# Patient Record
Sex: FEMALE | Race: WHITE | Employment: FULL TIME | URBAN - METROPOLITAN AREA
[De-identification: names, ages, dates, MRNs, and addresses within clinical notes are randomized per-mention and may not be internally consistent; named-entity substitution may affect disease eponyms.]

---

## 2020-04-11 ENCOUNTER — APPOINTMENT (OUTPATIENT)
Dept: GENERAL RADIOLOGY | Age: 50
DRG: 153 | End: 2020-04-11
Attending: EMERGENCY MEDICINE
Payer: COMMERCIAL

## 2020-04-11 ENCOUNTER — HOSPITAL ENCOUNTER (INPATIENT)
Facility: HOSPITAL | Age: 50
LOS: 3 days | Discharge: HOME | DRG: 153 | End: 2020-04-14
Attending: EMERGENCY MEDICINE | Admitting: HOSPITALIST
Payer: COMMERCIAL

## 2020-04-11 ENCOUNTER — APPOINTMENT (OUTPATIENT)
Dept: GENERAL RADIOLOGY | Facility: HOSPITAL | Age: 50
DRG: 153 | End: 2020-04-11
Attending: INTERNAL MEDICINE
Payer: COMMERCIAL

## 2020-04-11 DIAGNOSIS — J05.10 EPIGLOTTITIS: Primary | ICD-10-CM

## 2020-04-11 PROCEDURE — 71045 X-RAY EXAM CHEST 1 VIEW: CPT | Performed by: INTERNAL MEDICINE

## 2020-04-11 PROCEDURE — 70360 X-RAY EXAM OF NECK: CPT | Performed by: EMERGENCY MEDICINE

## 2020-04-11 PROCEDURE — 99223 1ST HOSP IP/OBS HIGH 75: CPT | Performed by: INTERNAL MEDICINE

## 2020-04-11 RX ORDER — SODIUM PHOSPHATE, DIBASIC AND SODIUM PHOSPHATE, MONOBASIC 7; 19 G/133ML; G/133ML
1 ENEMA RECTAL ONCE AS NEEDED
Status: DISCONTINUED | OUTPATIENT
Start: 2020-04-11 | End: 2020-04-14

## 2020-04-11 RX ORDER — HYDROMORPHONE HYDROCHLORIDE 1 MG/ML
0.5 INJECTION, SOLUTION INTRAMUSCULAR; INTRAVENOUS; SUBCUTANEOUS EVERY 30 MIN PRN
Status: DISCONTINUED | OUTPATIENT
Start: 2020-04-11 | End: 2020-04-14

## 2020-04-11 RX ORDER — DOCUSATE SODIUM 100 MG/1
100 CAPSULE, LIQUID FILLED ORAL 2 TIMES DAILY
Status: DISCONTINUED | OUTPATIENT
Start: 2020-04-11 | End: 2020-04-14

## 2020-04-11 RX ORDER — BISACODYL 10 MG
10 SUPPOSITORY, RECTAL RECTAL
Status: DISCONTINUED | OUTPATIENT
Start: 2020-04-11 | End: 2020-04-14

## 2020-04-11 RX ORDER — POLYETHYLENE GLYCOL 3350 17 G/17G
17 POWDER, FOR SOLUTION ORAL DAILY PRN
Status: DISCONTINUED | OUTPATIENT
Start: 2020-04-11 | End: 2020-04-14

## 2020-04-11 RX ORDER — ONDANSETRON 2 MG/ML
4 INJECTION INTRAMUSCULAR; INTRAVENOUS EVERY 4 HOURS PRN
Status: DISCONTINUED | OUTPATIENT
Start: 2020-04-11 | End: 2020-04-11

## 2020-04-11 RX ORDER — METOCLOPRAMIDE HYDROCHLORIDE 5 MG/ML
10 INJECTION INTRAMUSCULAR; INTRAVENOUS EVERY 8 HOURS PRN
Status: DISCONTINUED | OUTPATIENT
Start: 2020-04-11 | End: 2020-04-14

## 2020-04-11 RX ORDER — SODIUM CHLORIDE 9 MG/ML
INJECTION, SOLUTION INTRAVENOUS CONTINUOUS
Status: DISCONTINUED | OUTPATIENT
Start: 2020-04-11 | End: 2020-04-11

## 2020-04-11 RX ORDER — DEXAMETHASONE SODIUM PHOSPHATE 4 MG/ML
10 VIAL (ML) INJECTION ONCE
Status: COMPLETED | OUTPATIENT
Start: 2020-04-11 | End: 2020-04-11

## 2020-04-11 RX ORDER — HYDROMORPHONE HYDROCHLORIDE 1 MG/ML
0.5 INJECTION, SOLUTION INTRAMUSCULAR; INTRAVENOUS; SUBCUTANEOUS EVERY 30 MIN PRN
Status: ACTIVE | OUTPATIENT
Start: 2020-04-11 | End: 2020-04-11

## 2020-04-11 RX ORDER — ENOXAPARIN SODIUM 100 MG/ML
40 INJECTION SUBCUTANEOUS EVERY 24 HOURS
Status: DISCONTINUED | OUTPATIENT
Start: 2020-04-11 | End: 2020-04-14

## 2020-04-11 RX ORDER — ACETAMINOPHEN AND CODEINE PHOSPHATE 300; 30 MG/1; MG/1
2 TABLET ORAL EVERY 4 HOURS PRN
Status: DISCONTINUED | OUTPATIENT
Start: 2020-04-11 | End: 2020-04-14

## 2020-04-11 RX ORDER — ONDANSETRON 2 MG/ML
4 INJECTION INTRAMUSCULAR; INTRAVENOUS EVERY 6 HOURS PRN
Status: DISCONTINUED | OUTPATIENT
Start: 2020-04-11 | End: 2020-04-14

## 2020-04-11 RX ORDER — ONDANSETRON 2 MG/ML
4 INJECTION INTRAMUSCULAR; INTRAVENOUS ONCE
Status: COMPLETED | OUTPATIENT
Start: 2020-04-11 | End: 2020-04-11

## 2020-04-11 RX ORDER — FAMOTIDINE 10 MG/ML
20 INJECTION, SOLUTION INTRAVENOUS 2 TIMES DAILY
Status: DISCONTINUED | OUTPATIENT
Start: 2020-04-11 | End: 2020-04-14

## 2020-04-11 RX ORDER — ACETAMINOPHEN AND CODEINE PHOSPHATE 300; 30 MG/1; MG/1
1 TABLET ORAL EVERY 4 HOURS PRN
Status: DISCONTINUED | OUTPATIENT
Start: 2020-04-11 | End: 2020-04-14

## 2020-04-11 RX ORDER — ACETAMINOPHEN 325 MG/1
650 TABLET ORAL EVERY 4 HOURS PRN
Status: DISCONTINUED | OUTPATIENT
Start: 2020-04-11 | End: 2020-04-14

## 2020-04-11 NOTE — ED PROVIDER NOTES
Patient Seen in: THE [facility] Emergency Department In [place]      History   Patient presents with:  Sore Throat    Stated Complaint: sore throat, swollen glands    HPI    81-year-old female presents to the emergency department complaining of severe throat without extra sounds or murmurs. Regular rate and rhythm. Skin is dry without rashes or lesions. Neuro exam: Awake, conversive and moving all 4 extremities well.     ED Course     Labs Reviewed   CBC W/ DIFFERENTIAL - Abnormal; Notable for the following (exclusive of billable procedures) was administered to manage the patient's respiratory instability due to her epiglottitis.   This involved direct patient intervention, complex decision making, and/or extensive discussions with the patient, family, and cli

## 2020-04-11 NOTE — CONSULTS
[facility]  Report of Consultation    Wallace Vargas Patient Status:  Inpatient    1970 MRN [id number]   [facility] 6NE-A Attending Darling Ferris, DO   Hosp Day # 0 PCP No primary care provider on file.      Reason for Consultation: Systems:    Constitutional: States her weights been stable  Eyes: Denies any significant changes  Ears, nose, mouth, throat, and face: Sore throat difficulty swallowing as above  Respiratory: She denies any baseline coughing or dyspnea  Cardiovascular: She CO2 25.0 04/11/2020     04/11/2020    CA 9.8 04/11/2020    ALB 4.3 04/11/2020    ALKPHO 84 04/11/2020    BILT 2.1 04/11/2020    TP 7.8 04/11/2020    AST 26 04/11/2020    ALT 36 04/11/2020       Cultures:   Cultures are pending    Radiology:  Xr Soft

## 2020-04-11 NOTE — PLAN OF CARE
Arrived from [place] ED approx. 1445. Pt states that she only has minimal pain rating at 3/10. States that whatever they gave her in the ER is working. Holding a conversation, not dyspneic. Maintaining saturations around 93% RA.    Updated her sister of

## 2020-04-11 NOTE — CONSULTS
INFECTIOUS DISEASE CONSULTATION    Nataly Carter Patient Status:  Inpatient    1970 MRN [id number]   [facility] 6NE-A Attending Kevin Hernández, DO   Hosp Day # 0 PCP No primary care prov GM/118ML enema 133 mL, 1 enema, Rectal, Once PRN  •  acetaminophen (TYLENOL) tab 650 mg, 650 mg, Oral, Q4H PRN **OR** Acetaminophen-Codeine #3 (TYLENOL #3) 300-30 MG tab 1 tablet, 1 tablet, Oral, Q4H PRN **OR** Acetaminophen-Codeine #3 (TYLENOL #3) 300-30 ENT consult, watch air way    Testing ordered for SARS, RVP, and throat culture pending  dw nursing, will need to transfer to [facility] in [place] in case she needs emergent trach - pending SARS result    Joyce Low MD  [address] [place] [address]

## 2020-04-11 NOTE — RESPIRATORY THERAPY NOTE
SARS COVID and resp panel expanded swabs done. N95, gloves, gown, goggles PPE worn. Labeled and sent to lab.

## 2020-04-11 NOTE — H&P
[facility]  History and Physical     Palomomonie Ibarra Patient Status:  Inpatient    1970 MRN [id number]   [facility] 6NE-A Attending Sandra Joshua DO   Hosp Day # 0 PCP No primary care provider on file.      Chief Complaint: Sore atraumatic. Moist mucous membranes. EOM-I. Neck: No lymphadenopathy. No JVD  Respiratory: Clear to auscultation bilaterally. No wheezes. No rhonchi. Cardiovascular: S1, S2. Regular rate and rhythm. No murmurs, rubs or gallops. Equal pulses.    Chest and

## 2020-04-12 ENCOUNTER — APPOINTMENT (OUTPATIENT)
Dept: CT IMAGING | Facility: HOSPITAL | Age: 50
DRG: 153 | End: 2020-04-12
Attending: INTERNAL MEDICINE
Payer: COMMERCIAL

## 2020-04-12 PROCEDURE — 70491 CT SOFT TISSUE NECK W/DYE: CPT | Performed by: INTERNAL MEDICINE

## 2020-04-12 PROCEDURE — 99232 SBSQ HOSP IP/OBS MODERATE 35: CPT | Performed by: INTERNAL MEDICINE

## 2020-04-12 PROCEDURE — 0CJS8ZZ INSPECTION OF LARYNX, VIA NATURAL OR ARTIFICIAL OPENING ENDOSCOPIC: ICD-10-PCS | Performed by: OTOLARYNGOLOGY

## 2020-04-12 RX ORDER — DEXAMETHASONE SODIUM PHOSPHATE 4 MG/ML
10 VIAL (ML) INJECTION ONCE
Status: COMPLETED | OUTPATIENT
Start: 2020-04-12 | End: 2020-04-12

## 2020-04-12 RX ORDER — POTASSIUM CHLORIDE 14.9 MG/ML
20 INJECTION INTRAVENOUS ONCE
Status: COMPLETED | OUTPATIENT
Start: 2020-04-12 | End: 2020-04-12

## 2020-04-12 RX ORDER — DEXAMETHASONE SODIUM PHOSPHATE 4 MG/ML
8 VIAL (ML) INJECTION EVERY 8 HOURS
Status: DISCONTINUED | OUTPATIENT
Start: 2020-04-12 | End: 2020-04-13

## 2020-04-12 NOTE — SLP NOTE
Chart reviewed and orders received. Spoke with RN in regards to order- pt to remain NPO at this time given worsening symptoms. Pt currently on isolation for r/o COVID-19. Per COVID-19 protocol will hold until tests results are made available.  RN to inform

## 2020-04-12 NOTE — PROGRESS NOTES
Received this am alert and oriented  Complaints of feeling like throat is more swollen with pain extending to neck and behind ears as well as difficulty talking- Dr Susie Dawson notified  On 1 L Nasal canula  Lungs clear bilaterally  NSR on monitor  + SCD's in pl

## 2020-04-12 NOTE — PROGRESS NOTES
[facility]  Progress Note     Jan Gutierrez Patient Status:  Inpatient    1970 MRN [id number]   [facility] 4SW-A Attending Mikaela Hawley, DO   Hosp Day # 1 PCP No primary care provider on file.      Chief Complaint: dysphagia    S following  2. Rule/out COVID, low suspicion, hold off any hydroxychloroquine or azithromycin  3. Check Inflammatory markers and procalcitonin  4. Needs isolation until COVID back, transfer to medical ICU  3. Hyperglycemia - A1C 5.5%  4.  Oropharyngeal dysph

## 2020-04-12 NOTE — PLAN OF CARE
Assumed care of pt for the night shift. Pt alert and oriented x4. Placed on 1L NC overnight to sustain O2 sats greater than 92% pt saturations 90-91% on RA.  Per Dr. Alba Lal ENT, bedside RN dysphagia eval done, pt failed, coughing/ clearing of throat presen

## 2020-04-12 NOTE — PROGRESS NOTES
[facility]  Progress Note    Michaela Benz Patient Status:  Inpatient    1970 MRN [id number]   [facility] 4SW-A Attending [place],    Hosp Day # 1 PCP No primary care provider on file.      Subjective:  Michaela Benz is a(n) 4 CREATSERUM 0.76 0.65     No results for input(s): PTP, INR, PTT in the last 168 hours.     Cultures: Routine viral panel is negative  SARS-Cov is pending blood cultures pending  Group A strep negative  Radiology:  Xr Soft Tissue Neck (cpt=70360)    Addend

## 2020-04-12 NOTE — CONSULTS
Matilde Huang is a 51year old female. Patient presents with:  Sore Throat    HPI:    Patient admitted to the hospital from the [facility]. Her history is significant for a sore throat which started on Friday night.   It worsened t scope was not performed as this has been known to cause laryngospasm and obstruction in this patient population. In view of her significant improvement we will follow her clinically.     My recommendation is 24 to 48 hours of IV antibiotics and reassess (ADULT)  ADMIT TO INPATIENT  CONTACT AND [place] ISOLATION  REASON FOR NO DVT/VTE PHARMACOLOGIC PROPHYLAXIS  NPO  IP SLP EVAL AND TREAT  STRAIGHT CATH  VITAL SIGNS - NOTIFY PHYSICIAN

## 2020-04-13 ENCOUNTER — APPOINTMENT (OUTPATIENT)
Dept: GENERAL RADIOLOGY | Facility: HOSPITAL | Age: 50
DRG: 153 | End: 2020-04-13
Attending: INTERNAL MEDICINE
Payer: COMMERCIAL

## 2020-04-13 PROCEDURE — 71045 X-RAY EXAM CHEST 1 VIEW: CPT | Performed by: INTERNAL MEDICINE

## 2020-04-13 PROCEDURE — 99232 SBSQ HOSP IP/OBS MODERATE 35: CPT | Performed by: INTERNAL MEDICINE

## 2020-04-13 NOTE — SLP NOTE
Attempted evaluation, patient remains NPO and planning for ENT reassessment later today. Will continue to follow.     Warren Lozano Maik 88 CCC-SLP  Pager [phone]

## 2020-04-13 NOTE — PLAN OF CARE
Assumed patient care this AM. Up in chair. Patient states pain is improving. Remains NPO. Steroids D/C per Dr. Sierra Part ENT. Waiting speech eval. VSS. Transfer orders. Gave handoff report to RN.      Problem: PAIN - ADULT  Goal: Verbalizes/displays adequate

## 2020-04-13 NOTE — PLAN OF CARE
Pt transferred to [id number] at 1600H   AOX 4  RA, No SOB  NSR, SB  Pt reporting mild discomfort of throat   Seen by ST, approved for reg thin diet, menu given    Plan NPO post MN for possible procedure emily   Pt updated of plan of care

## 2020-04-13 NOTE — PLAN OF CARE
Received pt from ICU around 2100. Pt A/Ox4, following commands, and intact. Pt complaining of pain in throat rating it a 4/10 at beginning of night but denied pain medication.  Pt then reported she's \"doing much better\" and it \"doesn't hurt to swallow\"

## 2020-04-13 NOTE — PROGRESS NOTES
[facility]                INFECTIOUS DISEASE PROGRESS NOTE    Pedro Bauer Patient Status:  Inpatient    1970 MRN [id number]   [facility] 6NE-A Attending Nina Dixon MD   [facility] Day # 2 PCP Unknown Pcp     Antibiotics: Donita Garza 7.8 7.2 7.2       No results found for: [facility] Encounter on 04/11/20   1.  BLOOD CULTURE     Status: None (Preliminary result)    Collection Time: 04/11/20 12:59 PM   Result Value Ref Range    Blood Culture Result No Growth 1 Day N/

## 2020-04-13 NOTE — PROGRESS NOTES
[facility]  Progress Note     Jacquelin Perdomo Patient Status:  Inpatient    1970 MRN [id number]   [facility] 4SW-A Attending Mayda Liz MD   Hosp Day # 2 PCP Unknown Pcp     Chief Complaint: dysphagia    S: Patient up in chair, ASSESSMENT / PLAN:     1. Epiglottitis  1. Unasyn Day 3  2. ENT evaluated  3. Npo  4. Steroids IV  5. CT no evidence of abscess  6. Rapid strep neg, RVP neg, COVID neg, NGTD on blood cx  2. Dysphagia 2/2 above  1. Npo, fu on ENT recs   3.  Hardy Camera

## 2020-04-13 NOTE — SLP NOTE
ADULT SWALLOWING EVALUATION    ASSESSMENT    ASSESSMENT/OVERALL IMPRESSION:  Pt seen this PM for bedside swallow evaluation. RN approved session. Pt admitted to hospital due to c/o +fever, sore throat, and b/l ear pain with worsening dysphagia.  Pt unable t for Referral: R/O aspiration    Problem List  Principal Problem:    Epiglottitis  Active Problems:    Leukocytosis    Oropharyngeal dysphagia      Past Medical History  History reviewed. No pertinent past medical history.        Diet Prior to Admission: Reg aryepiglottic folds which can also be seen secondary to the epiglottitis. OBJECTIVE   ORAL MOTOR EXAMINATION  Dentition: Natural;Functional  Symmetry: Within Functional Limits  Strength:  Within Functional Limits  Tone: Within Functional Limits  Rang

## 2020-04-13 NOTE — PROGRESS NOTES
Jan Gutierrez is a 51year old female. Patient presents with:  Sore Throat    HPI:    Patient is hospital day #2 for epiglottitis.     Patient's COVID-19 test was negative, so she was transferred to the [facility]  She feels as though her swallowing ha

## 2020-04-13 NOTE — PROGRESS NOTES
[facility]  Progress Note    Michaela Benz Patient Status:  Inpatient    1970 MRN [id number]   [facility] 6NE-A Attending Nevin Green MD   Hosp Day # 2 PCP No primary care provider on file.      Subjective:  Michaela Benz is a(n) 4 93.9   MCH 32.1 32.0 32.4   MCHC 33.8 33.8 34.5   RDW 12.1 11.9 11.9   NEPRELIM 10.95* 10.26* 9.15*   WBC 13.3* 12.3* 10.6   .0 217.0 216.0     Recent Labs   Lab 04/11/20  1258 04/12/20  0441 04/13/20  0425   * 115* 123*   BUN 11 15 26*   CRE

## 2020-04-13 NOTE — PROGRESS NOTES
Wallace Vargas is a 51year old female. Patient presents with:  Sore Throat    HPI:    Patient is hospital day #1 for epiglottitis. When I saw her last night she was much improved.   This morning she started noticing additional soreness in her throat aga

## 2020-04-14 VITALS
RESPIRATION RATE: 17 BRPM | OXYGEN SATURATION: 96 % | HEART RATE: 66 BPM | HEIGHT: 66 IN | WEIGHT: 186.75 LBS | SYSTOLIC BLOOD PRESSURE: 115 MMHG | TEMPERATURE: 98 F | DIASTOLIC BLOOD PRESSURE: 73 MMHG | BODY MASS INDEX: 30.01 KG/M2

## 2020-04-14 PROCEDURE — 99238 HOSP IP/OBS DSCHRG MGMT 30/<: CPT | Performed by: INTERNAL MEDICINE

## 2020-04-14 RX ORDER — POTASSIUM CHLORIDE 1.5 G/1.77G
40 POWDER, FOR SOLUTION ORAL EVERY 4 HOURS
Status: DISCONTINUED | OUTPATIENT
Start: 2020-04-14 | End: 2020-04-14

## 2020-04-14 RX ORDER — AMOXICILLIN 400 MG/5ML
400 POWDER, FOR SUSPENSION ORAL 3 TIMES DAILY
Qty: 150 ML | Refills: 0 | Status: SHIPPED | OUTPATIENT
Start: 2020-04-14 | End: 2020-04-24

## 2020-04-14 NOTE — PROGRESS NOTES
[facility]  Progress Note     Dewitte Freeze Patient Status:  Inpatient    1970 MRN [id number]   Location [facility] 4SW-A Attending Ramona Pickering MD   Hosp Day # 3 PCP Unknown Pcp     Chief Complaint: dysphagia    S: Patient reports she t ampicillin-sulbactam  3 g Intravenous Q8H   • famoTIDine  20 mg Intravenous BID       ASSESSMENT / PLAN:     1. Epiglottitis  1. Unasyn Day 4  2. Tolerating po intake  3. Steroids IV - d/c'd per ENT  4. CT no evidence of abscess  5.  Rapid strep neg, RVP ne

## 2020-04-14 NOTE — PLAN OF CARE
Assumed care at 0730  Patient alert and oriented x4  Throat sore, but able to swallow fine  Ate breakfast. Tolerated well  IV antibiotics given    AVS reviewed with patient.  All questions answered  PO antibiotics given to patient from outpatient pharmacy

## 2020-04-14 NOTE — DISCHARGE SUMMARY
[facility]  DISCHARGE SUMMARY     New Kat Patient Status:  Inpatient    1970 MRN [id number]   [facility] 7NE-A Attending Ellen Mckeon MD   [address] Day # 3 PCP Unknown Pcp     Date of Admission: 2020  Date of Discharge: Risk  0-28   Low Risk  Patient was referred to the [facility]. TCM Follow-Up Recommendation:  LACE < 29: Low Risk of readmission after discharge from the hospital; Still recommend for TCM follow-up.     Procedures during hospitaliz PA    Time spent:   30 minutes    Addendum:     Agree with above note by JJ Greco.  Patient seen and examined.  Throat pain and ear pain almost resolved, tolerated diet     /74 (BP Location: Right arm)   Pulse 59   Temp 97.6 °F (36.4 °C) (

## 2020-04-14 NOTE — PROGRESS NOTES
Noa Doran is a 51year old female. Patient presents with:  Sore Throat    HPI:    Patient is hospital day #3 for epiglottitis. Patient feels much better. Swallowing without difficulty.     She had scrambled eggs for breakfast this morning and sena

## 2020-04-14 NOTE — PLAN OF CARE
Patient is alert and oriented. Reports minor discomfort to throat when swallowing. Denies shortness of breath. NSR/SB on telemetry. Afebrile. Receiving scheduled iv antibiotics. Ambulates independently in room. Plan to return home at discharge.        Probl minimize respiratory effort  - Oxygen supplementation based on oxygen saturation or ABGs  - Provide Smoking Cessation handout, if applicable  - Encourage broncho-pulmonary hygiene including cough, deep breathe, Incentive Spirometry  - Assess the need for s

## 2020-04-15 ENCOUNTER — VIRTUAL PHONE E/M (OUTPATIENT)
Dept: INTERNAL MEDICINE CLINIC | Facility: CLINIC | Age: 50
End: 2020-04-15
Payer: COMMERCIAL

## 2020-04-15 ENCOUNTER — TELEPHONE (OUTPATIENT)
Dept: INTERNAL MEDICINE CLINIC | Facility: CLINIC | Age: 50
End: 2020-04-15

## 2020-04-15 ENCOUNTER — PATIENT OUTREACH (OUTPATIENT)
Dept: CASE MANAGEMENT | Age: 50
End: 2020-04-15

## 2020-04-15 DIAGNOSIS — Z02.9 ENCOUNTERS FOR UNSPECIFIED ADMINISTRATIVE PURPOSE: ICD-10-CM

## 2020-04-15 DIAGNOSIS — J05.10 EPIGLOTTITIS: Primary | ICD-10-CM

## 2020-04-15 PROCEDURE — 99213 OFFICE O/P EST LOW 20 MIN: CPT | Performed by: CLINICAL NURSE SPECIALIST

## 2020-04-15 PROCEDURE — 1111F DSCHRG MED/CURRENT MED MERGE: CPT | Performed by: CLINICAL NURSE SPECIALIST

## 2020-04-15 NOTE — TELEPHONE ENCOUNTER
[facility] PHARMACIST MEDICATION RECONCILIATION        Nataly Carter MRN [id number]    1970 PCP Unknown Pcp       Comments: Medication history completed by [address] [place] [address] Pharmacist with patient on the phone.     The following

## 2020-04-15 NOTE — PROGRESS NOTES
Virtual/Telephone Check-In    Joya Perrin verbally consents to a Virtual/Telephone Check-In service on 04/15/20. Patient understands and accepts financial responsibility for any deductible, co-insurance and/or co-pays associated with this service.     Dur

## 2020-04-15 NOTE — PROGRESS NOTES
Pt completed a Virtual Phone visit at [facility] this morning, closing encounter.

## 2021-06-13 ENCOUNTER — HOSPITAL ENCOUNTER (EMERGENCY)
Age: 51
Discharge: HOME | End: 2021-06-13
Attending: EMERGENCY MEDICINE
Payer: OTHER MISCELLANEOUS

## 2021-06-13 ENCOUNTER — APPOINTMENT (OUTPATIENT)
Dept: GENERAL RADIOLOGY | Age: 51
End: 2021-06-13
Attending: EMERGENCY MEDICINE
Payer: OTHER MISCELLANEOUS

## 2021-06-13 VITALS
DIASTOLIC BLOOD PRESSURE: 90 MMHG | HEART RATE: 77 BPM | SYSTOLIC BLOOD PRESSURE: 164 MMHG | HEIGHT: 66 IN | OXYGEN SATURATION: 98 % | WEIGHT: 190 LBS | RESPIRATION RATE: 16 BRPM | TEMPERATURE: 98 F | BODY MASS INDEX: 30.53 KG/M2

## 2021-06-13 DIAGNOSIS — S63.612A SPRAIN OF RIGHT MIDDLE FINGER, UNSPECIFIED SITE OF DIGIT, INITIAL ENCOUNTER: Primary | ICD-10-CM

## 2021-06-13 PROCEDURE — 73140 X-RAY EXAM OF FINGER(S): CPT | Performed by: EMERGENCY MEDICINE

## 2021-06-13 PROCEDURE — 29130 APPL FINGER SPLINT STATIC: CPT

## 2021-06-13 PROCEDURE — 99283 EMERGENCY DEPT VISIT LOW MDM: CPT

## 2021-06-13 NOTE — ED PROVIDER NOTES
Patient Seen in: [facility] In [place]      History   Patient presents with:  Arm or Hand Injury    Stated Complaint: right 3rd digit injury yesterday at work    HPI/Subjective:   HPI    Patient injured her right middle finger at work motion is limited secondary to pain. Other digits and MCPs as well as the dorsum of the hand are nontender.        ED Course   Labs Reviewed - No data to display                MDM      Patient was offered pain medication but declined    X-ray finger  CONC

## 2023-10-29 ENCOUNTER — APPOINTMENT (OUTPATIENT)
Dept: GENERAL RADIOLOGY | Age: 53
End: 2023-10-29
Attending: EMERGENCY MEDICINE
Payer: OTHER MISCELLANEOUS

## 2023-10-29 ENCOUNTER — HOSPITAL ENCOUNTER (EMERGENCY)
Age: 53
Discharge: HOME | End: 2023-10-29
Attending: EMERGENCY MEDICINE
Payer: OTHER MISCELLANEOUS

## 2023-10-29 VITALS
HEIGHT: 66 IN | HEART RATE: 88 BPM | SYSTOLIC BLOOD PRESSURE: 165 MMHG | TEMPERATURE: 97 F | OXYGEN SATURATION: 98 % | BODY MASS INDEX: 32.14 KG/M2 | WEIGHT: 200 LBS | RESPIRATION RATE: 18 BRPM | DIASTOLIC BLOOD PRESSURE: 89 MMHG

## 2023-10-29 DIAGNOSIS — S90.32XA CONTUSION OF LEFT FOOT, INITIAL ENCOUNTER: Primary | ICD-10-CM

## 2023-10-29 PROCEDURE — 99283 EMERGENCY DEPT VISIT LOW MDM: CPT

## 2023-10-29 PROCEDURE — 73630 X-RAY EXAM OF FOOT: CPT | Performed by: EMERGENCY MEDICINE

## 2023-10-29 NOTE — DISCHARGE INSTRUCTIONS
Ice intermittently, 20 minutes on and 20 minutes off. Ice for 30 minutes at bedtime. Elevate your foot at or above the level of your heart when you are lying or sitting for the next couple of days. Ibuprofen 600 mg every 6 hours as needed for pain. Take with food. Weightbearing as tolerated.

## (undated) NOTE — LETTER
Date & Time: 10/29/2023, 6:20 PM  Patient: Benita Dietz  Encounter Provider(s):    Nelly Gordon MD       To Whom It May Concern:    Benita Dietz was seen and treated in our department on 10/29/2023. She can return to work.     If you have any questions or concerns, please do not hesitate to call.        _____________________________  Physician/APC Signature